# Patient Record
Sex: FEMALE | Race: BLACK OR AFRICAN AMERICAN | NOT HISPANIC OR LATINO | Employment: UNEMPLOYED | URBAN - METROPOLITAN AREA
[De-identification: names, ages, dates, MRNs, and addresses within clinical notes are randomized per-mention and may not be internally consistent; named-entity substitution may affect disease eponyms.]

---

## 2018-11-28 ENCOUNTER — OFFICE VISIT (OUTPATIENT)
Dept: GASTROENTEROLOGY | Facility: CLINIC | Age: 53
End: 2018-11-28
Payer: COMMERCIAL

## 2018-11-28 ENCOUNTER — APPOINTMENT (OUTPATIENT)
Dept: LAB | Facility: CLINIC | Age: 53
End: 2018-11-28
Payer: COMMERCIAL

## 2018-11-28 ENCOUNTER — TELEPHONE (OUTPATIENT)
Dept: GASTROENTEROLOGY | Facility: CLINIC | Age: 53
End: 2018-11-28

## 2018-11-28 VITALS
WEIGHT: 125 LBS | HEIGHT: 62 IN | SYSTOLIC BLOOD PRESSURE: 130 MMHG | HEART RATE: 84 BPM | RESPIRATION RATE: 16 BRPM | DIASTOLIC BLOOD PRESSURE: 90 MMHG | BODY MASS INDEX: 23 KG/M2

## 2018-11-28 DIAGNOSIS — R14.0 BLOATING: ICD-10-CM

## 2018-11-28 DIAGNOSIS — J40 BRONCHITIS: ICD-10-CM

## 2018-11-28 DIAGNOSIS — R10.84 GENERALIZED ABDOMINAL PAIN: ICD-10-CM

## 2018-11-28 DIAGNOSIS — K59.00 CONSTIPATION, UNSPECIFIED CONSTIPATION TYPE: ICD-10-CM

## 2018-11-28 DIAGNOSIS — R10.13 EPIGASTRIC PAIN: ICD-10-CM

## 2018-11-28 DIAGNOSIS — R63.4 WEIGHT LOSS: ICD-10-CM

## 2018-11-28 DIAGNOSIS — R11.2 NAUSEA AND VOMITING, INTRACTABILITY OF VOMITING NOT SPECIFIED, UNSPECIFIED VOMITING TYPE: ICD-10-CM

## 2018-11-28 DIAGNOSIS — R10.13 DYSPEPSIA: ICD-10-CM

## 2018-11-28 DIAGNOSIS — R10.13 EPIGASTRIC PAIN: Primary | ICD-10-CM

## 2018-11-28 LAB
ALBUMIN SERPL BCP-MCNC: 3.8 G/DL (ref 3.5–5)
ALP SERPL-CCNC: 64 U/L (ref 46–116)
ALT SERPL W P-5'-P-CCNC: 20 U/L (ref 12–78)
ANION GAP SERPL CALCULATED.3IONS-SCNC: 6 MMOL/L (ref 4–13)
AST SERPL W P-5'-P-CCNC: 13 U/L (ref 5–45)
BASOPHILS # BLD AUTO: 0.01 THOUSANDS/ΜL (ref 0–0.1)
BASOPHILS NFR BLD AUTO: 0 % (ref 0–1)
BILIRUB SERPL-MCNC: 0.2 MG/DL (ref 0.2–1)
BUN SERPL-MCNC: 16 MG/DL (ref 5–25)
CALCIUM SERPL-MCNC: 9.1 MG/DL (ref 8.3–10.1)
CHLORIDE SERPL-SCNC: 104 MMOL/L (ref 100–108)
CO2 SERPL-SCNC: 28 MMOL/L (ref 21–32)
CREAT SERPL-MCNC: 0.85 MG/DL (ref 0.6–1.3)
CRP SERPL QL: <3 MG/L
EOSINOPHIL # BLD AUTO: 0 THOUSAND/ΜL (ref 0–0.61)
EOSINOPHIL NFR BLD AUTO: 0 % (ref 0–6)
ERYTHROCYTE [DISTWIDTH] IN BLOOD BY AUTOMATED COUNT: 15.6 % (ref 11.6–15.1)
FERRITIN SERPL-MCNC: 38 NG/ML (ref 8–388)
GFR SERPL CREATININE-BSD FRML MDRD: 79 ML/MIN/1.73SQ M
GLUCOSE P FAST SERPL-MCNC: 95 MG/DL (ref 65–99)
HCT VFR BLD AUTO: 37.4 % (ref 34.8–46.1)
HGB BLD-MCNC: 11.7 G/DL (ref 11.5–15.4)
IMM GRANULOCYTES # BLD AUTO: 0.14 THOUSAND/UL (ref 0–0.2)
IMM GRANULOCYTES NFR BLD AUTO: 2 % (ref 0–2)
IRON SATN MFR SERPL: 23 %
IRON SERPL-MCNC: 88 UG/DL (ref 50–170)
LYMPHOCYTES # BLD AUTO: 1.35 THOUSANDS/ΜL (ref 0.6–4.47)
LYMPHOCYTES NFR BLD AUTO: 20 % (ref 14–44)
MCH RBC QN AUTO: 26 PG (ref 26.8–34.3)
MCHC RBC AUTO-ENTMCNC: 31.3 G/DL (ref 31.4–37.4)
MCV RBC AUTO: 83 FL (ref 82–98)
MONOCYTES # BLD AUTO: 0.36 THOUSAND/ΜL (ref 0.17–1.22)
MONOCYTES NFR BLD AUTO: 5 % (ref 4–12)
NEUTROPHILS # BLD AUTO: 4.99 THOUSANDS/ΜL (ref 1.85–7.62)
NEUTS SEG NFR BLD AUTO: 73 % (ref 43–75)
NRBC BLD AUTO-RTO: 0 /100 WBCS
PLATELET # BLD AUTO: 188 THOUSANDS/UL (ref 149–390)
PMV BLD AUTO: 11.6 FL (ref 8.9–12.7)
POTASSIUM SERPL-SCNC: 3.6 MMOL/L (ref 3.5–5.3)
PROT SERPL-MCNC: 8.3 G/DL (ref 6.4–8.2)
RBC # BLD AUTO: 4.5 MILLION/UL (ref 3.81–5.12)
SODIUM SERPL-SCNC: 138 MMOL/L (ref 136–145)
TIBC SERPL-MCNC: 385 UG/DL (ref 250–450)
TSH SERPL DL<=0.05 MIU/L-ACNC: 0.64 UIU/ML (ref 0.36–3.74)
WBC # BLD AUTO: 6.85 THOUSAND/UL (ref 4.31–10.16)

## 2018-11-28 PROCEDURE — 99244 OFF/OP CNSLTJ NEW/EST MOD 40: CPT | Performed by: NURSE PRACTITIONER

## 2018-11-28 PROCEDURE — 36415 COLL VENOUS BLD VENIPUNCTURE: CPT

## 2018-11-28 PROCEDURE — 83540 ASSAY OF IRON: CPT

## 2018-11-28 PROCEDURE — 83550 IRON BINDING TEST: CPT

## 2018-11-28 PROCEDURE — 86140 C-REACTIVE PROTEIN: CPT

## 2018-11-28 PROCEDURE — 82728 ASSAY OF FERRITIN: CPT

## 2018-11-28 PROCEDURE — 80053 COMPREHEN METABOLIC PANEL: CPT

## 2018-11-28 PROCEDURE — 84443 ASSAY THYROID STIM HORMONE: CPT

## 2018-11-28 PROCEDURE — 85025 COMPLETE CBC W/AUTO DIFF WBC: CPT

## 2018-11-28 RX ORDER — PREDNISONE 10 MG/1
TABLET ORAL DAILY
COMMUNITY

## 2018-11-28 RX ORDER — OMEPRAZOLE 20 MG/1
20 CAPSULE, DELAYED RELEASE ORAL 2 TIMES DAILY
Qty: 60 CAPSULE | Refills: 1 | Status: SHIPPED | OUTPATIENT
Start: 2018-11-28 | End: 2019-01-29 | Stop reason: SDUPTHER

## 2018-11-28 RX ORDER — METOCLOPRAMIDE 5 MG/1
5 TABLET ORAL
Qty: 90 TABLET | Refills: 0 | Status: SHIPPED | OUTPATIENT
Start: 2018-11-28 | End: 2019-01-04 | Stop reason: SDUPTHER

## 2018-11-28 RX ORDER — PROMETHAZINE HYDROCHLORIDE AND CODEINE PHOSPHATE 6.25; 1 MG/5ML; MG/5ML
5 SYRUP ORAL EVERY 4 HOURS PRN
COMMUNITY

## 2018-11-28 RX ORDER — BUDESONIDE AND FORMOTEROL FUMARATE DIHYDRATE 160; 4.5 UG/1; UG/1
2 AEROSOL RESPIRATORY (INHALATION) 2 TIMES DAILY
COMMUNITY

## 2018-11-28 NOTE — PROGRESS NOTES
Assessment/Plan:    A 49-year-old female with one month history upper respiratory infection and what sounds like bronchitis currently being treated with antibiotic, prednisone and inhalers   Two week history of nausea, vomiting postprandially, epigastric pain, constipation worsened by patient's use of Pepto-Bismol that she will stop, weight loss, dysphagia, fatigue  Plan:     EGD, colonoscopy     Gastric emptying study     Blood work    Patient had an ultrasound and CT scan at another facility last week and so we will obtain results       Diagnoses and all orders for this visit:    Epigastric pain  -     Iron Panel; Future  -     CBC and differential; Future  -     Comprehensive metabolic panel; Future  -     C-reactive protein; Future  -     TSH, 3rd generation with Free T4 reflex; Future  -     NM gastric emptying; Future  -     metoclopramide (REGLAN) 5 mg tablet; Take 1 tablet (5 mg total) by mouth 3 (three) times a day before meals for 30 days  -     omeprazole (PriLOSEC) 20 mg delayed release capsule; Take 1 capsule (20 mg total) by mouth 2 (two) times a day for 30 days    Generalized abdominal pain  -     Iron Panel; Future  -     CBC and differential; Future  -     Comprehensive metabolic panel; Future  -     C-reactive protein; Future  -     TSH, 3rd generation with Free T4 reflex; Future  -     NM gastric emptying; Future  -     metoclopramide (REGLAN) 5 mg tablet; Take 1 tablet (5 mg total) by mouth 3 (three) times a day before meals for 30 days  -     omeprazole (PriLOSEC) 20 mg delayed release capsule; Take 1 capsule (20 mg total) by mouth 2 (two) times a day for 30 days    Bloating  -     Iron Panel; Future  -     CBC and differential; Future  -     Comprehensive metabolic panel; Future  -     C-reactive protein; Future  -     TSH, 3rd generation with Free T4 reflex; Future  -     NM gastric emptying; Future  -     metoclopramide (REGLAN) 5 mg tablet;  Take 1 tablet (5 mg total) by mouth 3 (three) times a day before meals for 30 days  -     omeprazole (PriLOSEC) 20 mg delayed release capsule; Take 1 capsule (20 mg total) by mouth 2 (two) times a day for 30 days    Dyspepsia  -     Iron Panel; Future  -     CBC and differential; Future  -     Comprehensive metabolic panel; Future  -     C-reactive protein; Future  -     TSH, 3rd generation with Free T4 reflex; Future  -     NM gastric emptying; Future  -     metoclopramide (REGLAN) 5 mg tablet; Take 1 tablet (5 mg total) by mouth 3 (three) times a day before meals for 30 days  -     omeprazole (PriLOSEC) 20 mg delayed release capsule; Take 1 capsule (20 mg total) by mouth 2 (two) times a day for 30 days    Constipation, unspecified constipation type  -     Iron Panel; Future  -     CBC and differential; Future  -     Comprehensive metabolic panel; Future  -     C-reactive protein; Future  -     TSH, 3rd generation with Free T4 reflex; Future  -     NM gastric emptying; Future  -     metoclopramide (REGLAN) 5 mg tablet; Take 1 tablet (5 mg total) by mouth 3 (three) times a day before meals for 30 days  -     omeprazole (PriLOSEC) 20 mg delayed release capsule; Take 1 capsule (20 mg total) by mouth 2 (two) times a day for 30 days    Nausea and vomiting, intractability of vomiting not specified, unspecified vomiting type  -     Iron Panel; Future  -     CBC and differential; Future  -     Comprehensive metabolic panel; Future  -     C-reactive protein; Future  -     TSH, 3rd generation with Free T4 reflex; Future  -     NM gastric emptying; Future  -     metoclopramide (REGLAN) 5 mg tablet; Take 1 tablet (5 mg total) by mouth 3 (three) times a day before meals for 30 days  -     omeprazole (PriLOSEC) 20 mg delayed release capsule; Take 1 capsule (20 mg total) by mouth 2 (two) times a day for 30 days    Bronchitis  -     Iron Panel; Future  -     CBC and differential; Future  -     Comprehensive metabolic panel; Future  -     C-reactive protein;  Future  -     TSH, 3rd generation with Free T4 reflex; Future  -     NM gastric emptying; Future  -     metoclopramide (REGLAN) 5 mg tablet; Take 1 tablet (5 mg total) by mouth 3 (three) times a day before meals for 30 days  -     omeprazole (PriLOSEC) 20 mg delayed release capsule; Take 1 capsule (20 mg total) by mouth 2 (two) times a day for 30 days    Weight loss  -     Iron Panel; Future  -     CBC and differential; Future  -     Comprehensive metabolic panel; Future  -     C-reactive protein; Future  -     TSH, 3rd generation with Free T4 reflex; Future  -     NM gastric emptying; Future  -     metoclopramide (REGLAN) 5 mg tablet; Take 1 tablet (5 mg total) by mouth 3 (three) times a day before meals for 30 days  -     omeprazole (PriLOSEC) 20 mg delayed release capsule; Take 1 capsule (20 mg total) by mouth 2 (two) times a day for 30 days    Other orders  -     ALBUTEROL IN; Inhale  -     budesonide-formoterol (SYMBICORT) 160-4 5 mcg/act inhaler; Inhale 2 puffs 2 (two) times a day Rinse mouth after use  -     predniSONE 10 mg tablet; Take by mouth daily  -     promethazine-codeine (PHENERGAN WITH CODEINE) 6 25-10 mg/5 mL syrup; Take 5 mL by mouth every 4 (four) hours as needed for cough            Subjective:      Patient ID: Carlee Garza is a 46 y o  female  51-year-old female in her normal state of health until a little over a month ago when she developed a horrible, nagging cough and shortness of breath  She seen her PCP who treated her with inhalers and antibiotics and two injections of steroid in the office  She was then referred to Pulmonary and is having a complete pulmonary workup done  She continues with her upper respiratory symptoms and is currently taking prednisone at 5 mg daily, Zithromax and continuing inhaler use  In between two and three weeks ago she started to have significant upper and lower GI symptoms and has lost about 10 lb    She has food fear, nausea, vomiting of bilious and food particles, epigastric pain and bloating, constipation  Generalized abdominal pain  She denies melena or hematochezia she does report a new onset of dysphagia over the past two weeks  The any medication she is taking is Pepto-Bismol which has worsened her constipation  She had a bowel movement yesterday  She has no rectal symptoms  Her last colonoscopy was many years ago  She had blood work yesterday she states but is unable to tell me what it was  She did have a CT scan ultrasound last week at [facility] and we will obtain those records and the blood work if we can  She is very fatigued and is waking up at night for breathing treatments and but sounds like perhaps GERD at night as well  We will give her Prilosec twice a day and I gave her small dose of Reglan to see a for her to take before meals to see if that helps her  She only eats twice a day I did encourage her to eat three small meals a day and to  take the Reglan prior  Abdominal Pain   Associated symptoms include arthralgias, constipation and vomiting  Pertinent negatives include no fever  The following portions of the patient's history were reviewed and updated as appropriate: She  has a past medical history of Anemia  She   Patient Active Problem List    Diagnosis Date Noted    Weight loss 11/28/2018    Epigastric pain 11/28/2018    Generalized abdominal pain 11/28/2018    Bloating 11/28/2018    Dyspepsia 11/28/2018    Constipation 11/28/2018    Nausea and vomiting 11/28/2018    Bronchitis 11/28/2018     She  has a past surgical history that includes Back surgery (2018) and Mandible surgery  Her family history includes No Known Problems in her mother  She  reports that she has never smoked  She has never used smokeless tobacco  She reports that she does not drink alcohol or use drugs    Current Outpatient Prescriptions   Medication Sig Dispense Refill    ALBUTEROL IN Inhale      budesonide-formoterol (SYMBICORT) 160-4 5 mcg/act inhaler Inhale 2 puffs 2 (two) times a day Rinse mouth after use   predniSONE 10 mg tablet Take by mouth daily      promethazine-codeine (PHENERGAN WITH CODEINE) 6 25-10 mg/5 mL syrup Take 5 mL by mouth every 4 (four) hours as needed for cough      metoclopramide (REGLAN) 5 mg tablet Take 1 tablet (5 mg total) by mouth 3 (three) times a day before meals for 30 days 90 tablet 0    omeprazole (PriLOSEC) 20 mg delayed release capsule Take 1 capsule (20 mg total) by mouth 2 (two) times a day for 30 days 60 capsule 1     No current facility-administered medications for this visit  No current outpatient prescriptions on file prior to visit  No current facility-administered medications on file prior to visit  She is allergic to amoxicillin       Review of Systems   Constitutional: Positive for activity change, appetite change, fatigue and unexpected weight change  Negative for chills and fever  HENT: Negative  Respiratory: Negative  Cardiovascular: Negative  Gastrointestinal: Positive for abdominal pain, constipation and vomiting  Musculoskeletal: Positive for arthralgias  Skin: Negative  Psychiatric/Behavioral: Negative  Objective:      /90   Pulse 84   Resp 16   Ht 5' 2" (1 575 m)   Wt 56 7 kg (125 lb)   BMI 22 86 kg/m²          Physical Exam   Constitutional: She appears well-developed and well-nourished  Cardiovascular: Normal rate and regular rhythm  Pulmonary/Chest: Effort normal and breath sounds normal    Abdominal: Soft  Bowel sounds are normal  There is tenderness

## 2018-11-28 NOTE — LETTER
November 29, 2018     Leatha Wells MD  [address] [facility] [address]    Patient: Rach Cuevas   YOB: 1965   Date of Visit: 11/28/2018       Dear Dr Lissette Davidson:    Thank you for referring Rach Cuevas to me for evaluation  Below are my notes for this consultation  If you have questions, please do not hesitate to call me  I look forward to following your patient along with you  Sincerely,        JEANNIE Blankenship        CC: No Recipients  JEANNIE Blankenship  11/28/2018 10:10 AM  Attested  Assessment/Plan:    A 29-year-old female with one month history upper respiratory infection and what sounds like bronchitis currently being treated with antibiotic, prednisone and inhalers   Two week history of nausea, vomiting postprandially, epigastric pain, constipation worsened by patient's use of Pepto-Bismol that she will stop, weight loss, dysphagia, fatigue  Plan:     EGD, colonoscopy     Gastric emptying study     Blood work    Patient had an ultrasound and CT scan at another facility last week and so we will obtain results       Diagnoses and all orders for this visit:    Epigastric pain  -     Iron Panel; Future  -     CBC and differential; Future  -     Comprehensive metabolic panel; Future  -     C-reactive protein; Future  -     TSH, 3rd generation with Free T4 reflex; Future  -     NM gastric emptying; Future  -     metoclopramide (REGLAN) 5 mg tablet; Take 1 tablet (5 mg total) by mouth 3 (three) times a day before meals for 30 days  -     omeprazole (PriLOSEC) 20 mg delayed release capsule; Take 1 capsule (20 mg total) by mouth 2 (two) times a day for 30 days    Generalized abdominal pain  -     Iron Panel; Future  -     CBC and differential; Future  -     Comprehensive metabolic panel; Future  -     C-reactive protein; Future  -     TSH, 3rd generation with Free T4 reflex; Future  -     NM gastric emptying; Future  -     metoclopramide (REGLAN) 5 mg tablet;  Take 1 tablet (5 mg total) by mouth 3 (three) times a day before meals for 30 days  -     omeprazole (PriLOSEC) 20 mg delayed release capsule; Take 1 capsule (20 mg total) by mouth 2 (two) times a day for 30 days    Bloating  -     Iron Panel; Future  -     CBC and differential; Future  -     Comprehensive metabolic panel; Future  -     C-reactive protein; Future  -     TSH, 3rd generation with Free T4 reflex; Future  -     NM gastric emptying; Future  -     metoclopramide (REGLAN) 5 mg tablet; Take 1 tablet (5 mg total) by mouth 3 (three) times a day before meals for 30 days  -     omeprazole (PriLOSEC) 20 mg delayed release capsule; Take 1 capsule (20 mg total) by mouth 2 (two) times a day for 30 days    Dyspepsia  -     Iron Panel; Future  -     CBC and differential; Future  -     Comprehensive metabolic panel; Future  -     C-reactive protein; Future  -     TSH, 3rd generation with Free T4 reflex; Future  -     NM gastric emptying; Future  -     metoclopramide (REGLAN) 5 mg tablet; Take 1 tablet (5 mg total) by mouth 3 (three) times a day before meals for 30 days  -     omeprazole (PriLOSEC) 20 mg delayed release capsule; Take 1 capsule (20 mg total) by mouth 2 (two) times a day for 30 days    Constipation, unspecified constipation type  -     Iron Panel; Future  -     CBC and differential; Future  -     Comprehensive metabolic panel; Future  -     C-reactive protein; Future  -     TSH, 3rd generation with Free T4 reflex; Future  -     NM gastric emptying; Future  -     metoclopramide (REGLAN) 5 mg tablet; Take 1 tablet (5 mg total) by mouth 3 (three) times a day before meals for 30 days  -     omeprazole (PriLOSEC) 20 mg delayed release capsule; Take 1 capsule (20 mg total) by mouth 2 (two) times a day for 30 days    Nausea and vomiting, intractability of vomiting not specified, unspecified vomiting type  -     Iron Panel; Future  -     CBC and differential; Future  -     Comprehensive metabolic panel;  Future  - C-reactive protein; Future  -     TSH, 3rd generation with Free T4 reflex; Future  -     NM gastric emptying; Future  -     metoclopramide (REGLAN) 5 mg tablet; Take 1 tablet (5 mg total) by mouth 3 (three) times a day before meals for 30 days  -     omeprazole (PriLOSEC) 20 mg delayed release capsule; Take 1 capsule (20 mg total) by mouth 2 (two) times a day for 30 days    Bronchitis  -     Iron Panel; Future  -     CBC and differential; Future  -     Comprehensive metabolic panel; Future  -     C-reactive protein; Future  -     TSH, 3rd generation with Free T4 reflex; Future  -     NM gastric emptying; Future  -     metoclopramide (REGLAN) 5 mg tablet; Take 1 tablet (5 mg total) by mouth 3 (three) times a day before meals for 30 days  -     omeprazole (PriLOSEC) 20 mg delayed release capsule; Take 1 capsule (20 mg total) by mouth 2 (two) times a day for 30 days    Weight loss  -     Iron Panel; Future  -     CBC and differential; Future  -     Comprehensive metabolic panel; Future  -     C-reactive protein; Future  -     TSH, 3rd generation with Free T4 reflex; Future  -     NM gastric emptying; Future  -     metoclopramide (REGLAN) 5 mg tablet; Take 1 tablet (5 mg total) by mouth 3 (three) times a day before meals for 30 days  -     omeprazole (PriLOSEC) 20 mg delayed release capsule; Take 1 capsule (20 mg total) by mouth 2 (two) times a day for 30 days    Other orders  -     ALBUTEROL IN; Inhale  -     budesonide-formoterol (SYMBICORT) 160-4 5 mcg/act inhaler; Inhale 2 puffs 2 (two) times a day Rinse mouth after use  -     predniSONE 10 mg tablet; Take by mouth daily  -     promethazine-codeine (PHENERGAN WITH CODEINE) 6 25-10 mg/5 mL syrup; Take 5 mL by mouth every 4 (four) hours as needed for cough            Subjective:      Patient ID: Jennifer Dill is a 46 y o  female      26-year-old female in her normal state of health until a little over a month ago when she developed a horrible, nagging cough and shortness of breath  She seen her PCP who treated her with inhalers and antibiotics and two injections of steroid in the office  She was then referred to Pulmonary and is having a complete pulmonary workup done  She continues with her upper respiratory symptoms and is currently taking prednisone at 5 mg daily, Zithromax and continuing inhaler use  In between two and three weeks ago she started to have significant upper and lower GI symptoms and has lost about 10 lb  She has food fear, nausea, vomiting of bilious and food particles, epigastric pain and bloating, constipation  Generalized abdominal pain  She denies melena or hematochezia she does report a new onset of dysphagia over the past two weeks  The any medication she is taking is Pepto-Bismol which has worsened her constipation  She had a bowel movement yesterday  She has no rectal symptoms  Her last colonoscopy was many years ago  She had blood work yesterday she states but is unable to tell me what it was  She did have a CT scan ultrasound last week at [facility] and we will obtain those records and the blood work if we can  She is very fatigued and is waking up at night for breathing treatments and but sounds like perhaps GERD at night as well  We will give her Prilosec twice a day and I gave her small dose of Reglan to see a for her to take before meals to see if that helps her  She only eats twice a day I did encourage her to eat three small meals a day and to  take the Reglan prior  Abdominal Pain   Associated symptoms include arthralgias, constipation and vomiting  Pertinent negatives include no fever  The following portions of the patient's history were reviewed and updated as appropriate: She  has a past medical history of Anemia    She   Patient Active Problem List    Diagnosis Date Noted    Weight loss 11/28/2018    Epigastric pain 11/28/2018    Generalized abdominal pain 11/28/2018    Bloating 11/28/2018    Dyspepsia 11/28/2018    Constipation 11/28/2018    Nausea and vomiting 11/28/2018    Bronchitis 11/28/2018     She  has a past surgical history that includes Back surgery (2018) and Mandible surgery  Her family history includes No Known Problems in her mother  She  reports that she has never smoked  She has never used smokeless tobacco  She reports that she does not drink alcohol or use drugs  Current Outpatient Prescriptions   Medication Sig Dispense Refill    ALBUTEROL IN Inhale      budesonide-formoterol (SYMBICORT) 160-4 5 mcg/act inhaler Inhale 2 puffs 2 (two) times a day Rinse mouth after use   predniSONE 10 mg tablet Take by mouth daily      promethazine-codeine (PHENERGAN WITH CODEINE) 6 25-10 mg/5 mL syrup Take 5 mL by mouth every 4 (four) hours as needed for cough      metoclopramide (REGLAN) 5 mg tablet Take 1 tablet (5 mg total) by mouth 3 (three) times a day before meals for 30 days 90 tablet 0    omeprazole (PriLOSEC) 20 mg delayed release capsule Take 1 capsule (20 mg total) by mouth 2 (two) times a day for 30 days 60 capsule 1     No current facility-administered medications for this visit  No current outpatient prescriptions on file prior to visit  No current facility-administered medications on file prior to visit  She is allergic to amoxicillin       Review of Systems   Constitutional: Positive for activity change, appetite change, fatigue and unexpected weight change  Negative for chills and fever  HENT: Negative  Respiratory: Negative  Cardiovascular: Negative  Gastrointestinal: Positive for abdominal pain, constipation and vomiting  Musculoskeletal: Positive for arthralgias  Skin: Negative  Psychiatric/Behavioral: Negative  Objective:      /90   Pulse 84   Resp 16   Ht 5' 2" (1 575 m)   Wt 56 7 kg (125 lb)   BMI 22 86 kg/m²           Physical Exam   Constitutional: She appears well-developed and well-nourished     Cardiovascular: Normal rate and regular rhythm  Pulmonary/Chest: Effort normal and breath sounds normal    Abdominal: Soft  Bowel sounds are normal  There is tenderness  Attestation signed by Kevin Meraz MD at 11/28/2018 10:33 AM:  I supervised the Advanced Practitioner  I reviewed the Advanced Practitioner note and agree      Kevin Meraz MD 11/28/18

## 2018-11-28 NOTE — TELEPHONE ENCOUNTER
----- Message from Juliana Canela, [address] sent at 11/28/2018  2:58 PM EST -----  Please let Aretha James know her blood count is just slightly low normal but everything including her thyroid panel is perfect!

## 2018-12-03 ENCOUNTER — TELEPHONE (OUTPATIENT)
Dept: GASTROENTEROLOGY | Facility: CLINIC | Age: 53
End: 2018-12-03

## 2018-12-03 NOTE — TELEPHONE ENCOUNTER
PATIENT DOES NOT NEED TO R/S SHE TOOK AN ALEVE LAST NIGHT AND I ADVISED HER NOT TO TAKE ANYMORE SO SHE WILL BE KEEPING HER PROCEDURE DATE OF 12/6/18

## 2018-12-05 PROBLEM — R10.13 ABDOMINAL PAIN, EPIGASTRIC: Status: ACTIVE | Noted: 2018-12-05

## 2018-12-05 PROBLEM — R63.4 LOSS OF WEIGHT: Status: ACTIVE | Noted: 2018-12-05

## 2018-12-05 PROBLEM — R10.84 ABDOMINAL PAIN, GENERALIZED: Status: ACTIVE | Noted: 2018-12-05

## 2018-12-12 ENCOUNTER — ANESTHESIA EVENT (OUTPATIENT)
Dept: PERIOP | Facility: HOSPITAL | Age: 53
End: 2018-12-12
Payer: COMMERCIAL

## 2018-12-12 ENCOUNTER — ANESTHESIA (OUTPATIENT)
Dept: PERIOP | Facility: HOSPITAL | Age: 53
End: 2018-12-12
Payer: COMMERCIAL

## 2018-12-12 ENCOUNTER — HOSPITAL ENCOUNTER (OUTPATIENT)
Facility: HOSPITAL | Age: 53
Setting detail: OUTPATIENT SURGERY
Discharge: HOME | End: 2018-12-12
Attending: INTERNAL MEDICINE | Admitting: INTERNAL MEDICINE
Payer: COMMERCIAL

## 2018-12-12 VITALS
HEIGHT: 63 IN | SYSTOLIC BLOOD PRESSURE: 121 MMHG | WEIGHT: 117.73 LBS | HEART RATE: 84 BPM | BODY MASS INDEX: 20.86 KG/M2 | OXYGEN SATURATION: 100 % | DIASTOLIC BLOOD PRESSURE: 73 MMHG | TEMPERATURE: 97.5 F | RESPIRATION RATE: 14 BRPM

## 2018-12-12 DIAGNOSIS — K59.00 CONSTIPATION, UNSPECIFIED CONSTIPATION TYPE: ICD-10-CM

## 2018-12-12 DIAGNOSIS — R10.84 ABDOMINAL PAIN, GENERALIZED: ICD-10-CM

## 2018-12-12 DIAGNOSIS — R10.13 ABDOMINAL PAIN, EPIGASTRIC: ICD-10-CM

## 2018-12-12 DIAGNOSIS — R11.2 NAUSEA AND VOMITING, INTRACTABILITY OF VOMITING NOT SPECIFIED, UNSPECIFIED VOMITING TYPE: ICD-10-CM

## 2018-12-12 DIAGNOSIS — R63.4 LOSS OF WEIGHT: ICD-10-CM

## 2018-12-12 PROCEDURE — 88342 IMHCHEM/IMCYTCHM 1ST ANTB: CPT | Performed by: PATHOLOGY

## 2018-12-12 PROCEDURE — 88305 TISSUE EXAM BY PATHOLOGIST: CPT | Performed by: PATHOLOGY

## 2018-12-12 PROCEDURE — 45378 DIAGNOSTIC COLONOSCOPY: CPT | Performed by: INTERNAL MEDICINE

## 2018-12-12 PROCEDURE — 43239 EGD BIOPSY SINGLE/MULTIPLE: CPT | Performed by: INTERNAL MEDICINE

## 2018-12-12 RX ORDER — SODIUM CHLORIDE, SODIUM LACTATE, POTASSIUM CHLORIDE, CALCIUM CHLORIDE 600; 310; 30; 20 MG/100ML; MG/100ML; MG/100ML; MG/100ML
75 INJECTION, SOLUTION INTRAVENOUS CONTINUOUS
Status: DISCONTINUED | OUTPATIENT
Start: 2018-12-12 | End: 2018-12-12 | Stop reason: HOSPADM

## 2018-12-12 RX ORDER — GLYCOPYRROLATE 0.2 MG/ML
INJECTION INTRAMUSCULAR; INTRAVENOUS AS NEEDED
Status: DISCONTINUED | OUTPATIENT
Start: 2018-12-12 | End: 2018-12-12 | Stop reason: SURG

## 2018-12-12 RX ORDER — PROPOFOL 10 MG/ML
INJECTION, EMULSION INTRAVENOUS AS NEEDED
Status: DISCONTINUED | OUTPATIENT
Start: 2018-12-12 | End: 2018-12-12 | Stop reason: SURG

## 2018-12-12 RX ADMIN — SODIUM CHLORIDE, SODIUM LACTATE, POTASSIUM CHLORIDE, AND CALCIUM CHLORIDE: .6; .31; .03; .02 INJECTION, SOLUTION INTRAVENOUS at 09:32

## 2018-12-12 RX ADMIN — PROPOFOL 100 MG: 10 INJECTION, EMULSION INTRAVENOUS at 10:11

## 2018-12-12 RX ADMIN — PROPOFOL 30 MG: 10 INJECTION, EMULSION INTRAVENOUS at 10:16

## 2018-12-12 RX ADMIN — PROPOFOL 30 MG: 10 INJECTION, EMULSION INTRAVENOUS at 10:22

## 2018-12-12 RX ADMIN — GLYCOPYRROLATE 0.2 MG: 0.2 INJECTION, SOLUTION INTRAMUSCULAR; INTRAVENOUS at 10:09

## 2018-12-12 NOTE — DISCHARGE INSTRUCTIONS

## 2018-12-12 NOTE — DISCHARGE INSTR - AVS FIRST PAGE
Postoperative Note  PATIENT NAME: Martínez Alonso  : 1965  MRN: [id number]  [facility]    Surgery Date: 2018    POST-OP DIAGNOSIS: See the impression below    SEDATION: Monitored anesthesia care, check anesthesia records    PHYSICAL EXAM:  Vitals:    18 0907   BP: 125/80   Pulse: 85   Resp: 16   Temp: 97 9 °F (36 6 °C)   SpO2: 100%     Body mass index is 20 85 kg/m²  General: NAD  Heart: S1 & S2 normal, RRR  Lungs: CTA, No rales or rhonchi  Abdomen: Soft, nontender, nondistended, good bowel sounds    CONSENT:  Informed consent was obtained for the procedure, including sedation after explaining the risks and benefits of the procedure  Risks including but not limited to bleeding, perforation, infection, aspiration were discussed in detail  Also explained about less than 100%$ sensitivity with the exam and other alternatives  DESCRIPTION:   Procedure:  Esophagogastroduodenoscopy with biopsy    Indications:  55-year-old female with nausea, vomiting, midepigastric pain and dysphagia    ASA 1    Estimated blood loss: Insignificant    Premedicated with mac anesthesia    Patient is identified by me  She is examined prior to the procedure and found to be in stable condition  She is attached to cardiac monitor and pulse oximeter and placed in the left lateral position  After adequate intravenous sedation the Olympus video gastroscope was inserted under direct vision into the esophagus  The esophageal mucosa is completely normal throughout its length with a normal Z-line at 40 cm from incisors  The stomach is entered entered  Body and antrum normal   The pylorus is normal   The duodenum was cannulated into the 3rd portion and is normal   Retroversion reveals a normal GE junction and fundus  Cardia is normal   Biopsies taken from the antrum body fundus with excellent hemostasis  Biopsies taken from the distal and proximal esophagus with excellent hemostasis    She tolerated the procedure well and was stable throughout  My impression:  Normal upper GI endoscopy, status post biopsy    RECOMMENDATIONS:  Follow up biopsy results in 1 week  Continue current medical management    COMPLICATIONS:  None; patient tolerated the procedure well  DISPOSITION: PACU  CONDITION: Stable    Vanessa Benitez MD  2018,10:16 AM        Portions of the record may have been created with voice recognition software   Occasional wrong word or "sound a like" substitutions may have occurred due to the inherent limitations of voice recognition software   Read the chart carefully and recognize, using context, where substitutions have occurred  Postoperative Note  PATIENT NAME: Juanita Cueva  : 1965  MRN: [id number]  [facility]    Surgery Date: 2018    POST-OP DIAGNOSIS: See the impression below    SEDATION: Monitored anesthesia care, check anesthesia records    PHYSICAL EXAM:  Vitals:    18 0907   BP: 125/80   Pulse: 85   Resp: 16   Temp: 97 9 °F (36 6 °C)   SpO2: 100%     Body mass index is 20 85 kg/m²  General: NAD  Heart: S1 & S2 normal, RRR  Lungs: CTA, No rales or rhonchi  Abdomen: Soft, nontender, nondistended, good bowel sounds    CONSENT:  Informed consent was obtained for the procedure, including sedation after explaining the risks and benefits of the procedure  Risks including but not limited to bleeding, perforation, infection, aspiration were discussed in detail  Also explained about less than 100%$ sensitivity with the exam and other alternatives  DESCRIPTION:   Procedure:  Fiberoptic colonoscopy    Indications:  46year old female with constipation and weight loss of undetermined etiology    ASA 1    Estimated blood loss:  None    Premedicated with mac anesthesia    Patient is identified by me  She is examined prior to the procedure and found to be in stable condition  She is attached to the cardiac monitor and pulse oximeter and placed in the left lateral position  After adequate intravenous sedation the Olympus video colonoscope was inserted passed easily to the cecum  The ileocecal valve was identified as well as the appendiceal orifice  The valve was cannulated and the terminal ileum is examined for approximately 10 cm  The scope was slowly withdrawn with excellent circumferential visualization of the mucosa  The preparation is excellent  The terminal ileum and the colon a completely unremarkable with no inflammatory neoplastic or vascular lesions noted  Retroversion is normal   She tolerated the procedure well and was stable throughout  My impression:  Normal colonoscopy and terminal ileoscopy    RECOMMENDATIONS:  Follow-up colonoscopy in 10 years  Yearly fit test with primary care physician  Every 3 year colo guard test with primary care physician    COMPLICATIONS:  None; patient tolerated the procedure well  DISPOSITION: PACU  CONDITION: Stable    Farzad Whitehead MD  12/12/2018,10:27 AM        Portions of the record may have been created with voice recognition software   Occasional wrong word or "sound a like" substitutions may have occurred due to the inherent limitations of voice recognition software   Read the chart carefully and recognize, using context, where substitutions have occurred

## 2018-12-12 NOTE — OP NOTE
Postoperative Note  PATIENT NAME: Jennifer Dill  : 1965  MRN: [id number]  [facility]    Surgery Date: 2018    POST-OP DIAGNOSIS: See the impression below    SEDATION: Monitored anesthesia care, check anesthesia records    PHYSICAL EXAM:  Vitals:    18 0907   BP: 125/80   Pulse: 85   Resp: 16   Temp: 97 9 °F (36 6 °C)   SpO2: 100%     Body mass index is 20 85 kg/m²  General: NAD  Heart: S1 & S2 normal, RRR  Lungs: CTA, No rales or rhonchi  Abdomen: Soft, nontender, nondistended, good bowel sounds    CONSENT:  Informed consent was obtained for the procedure, including sedation after explaining the risks and benefits of the procedure  Risks including but not limited to bleeding, perforation, infection, aspiration were discussed in detail  Also explained about less than 100%$ sensitivity with the exam and other alternatives  DESCRIPTION:   Procedure:  Esophagogastroduodenoscopy with biopsy    Indications:  30-year-old female with nausea, vomiting, midepigastric pain and dysphagia    ASA 1    Estimated blood loss: Insignificant    Premedicated with mac anesthesia    Patient is identified by me  She is examined prior to the procedure and found to be in stable condition  She is attached to cardiac monitor and pulse oximeter and placed in the left lateral position  After adequate intravenous sedation the Olympus video gastroscope was inserted under direct vision into the esophagus  The esophageal mucosa is completely normal throughout its length with a normal Z-line at 40 cm from incisors  The stomach is entered entered  Body and antrum normal   The pylorus is normal   The duodenum was cannulated into the 3rd portion and is normal   Retroversion reveals a normal GE junction and fundus  Cardia is normal   Biopsies taken from the antrum body fundus with excellent hemostasis  Biopsies taken from the distal and proximal esophagus with excellent hemostasis    She tolerated the procedure well and was stable throughout  My impression:  Normal upper GI endoscopy, status post biopsy    RECOMMENDATIONS:  Follow up biopsy results in 1 week  Continue current medical management    COMPLICATIONS:  None; patient tolerated the procedure well  DISPOSITION: PACU  CONDITION: Stable    Lindsay Zacarias MD  12/12/2018,10:16 AM        Portions of the record may have been created with voice recognition software   Occasional wrong word or "sound a like" substitutions may have occurred due to the inherent limitations of voice recognition software   Read the chart carefully and recognize, using context, where substitutions have occurred

## 2018-12-12 NOTE — OP NOTE
Postoperative Note  PATIENT NAME: Nathan Wu  : 1965  MRN: [id number]  [facility]    Surgery Date: 2018    POST-OP DIAGNOSIS: See the impression below    SEDATION: Monitored anesthesia care, check anesthesia records    PHYSICAL EXAM:  Vitals:    18 0907   BP: 125/80   Pulse: 85   Resp: 16   Temp: 97 9 °F (36 6 °C)   SpO2: 100%     Body mass index is 20 85 kg/m²  General: NAD  Heart: S1 & S2 normal, RRR  Lungs: CTA, No rales or rhonchi  Abdomen: Soft, nontender, nondistended, good bowel sounds    CONSENT:  Informed consent was obtained for the procedure, including sedation after explaining the risks and benefits of the procedure  Risks including but not limited to bleeding, perforation, infection, aspiration were discussed in detail  Also explained about less than 100%$ sensitivity with the exam and other alternatives  DESCRIPTION:   Procedure:  Fiberoptic colonoscopy    Indications:  46year old female with constipation and weight loss of undetermined etiology    ASA 1    Estimated blood loss:  None    Premedicated with mac anesthesia    Patient is identified by me  She is examined prior to the procedure and found to be in stable condition  She is attached to the cardiac monitor and pulse oximeter and placed in the left lateral position  After adequate intravenous sedation the Olympus video colonoscope was inserted passed easily to the cecum  The ileocecal valve was identified as well as the appendiceal orifice  The valve was cannulated and the terminal ileum is examined for approximately 10 cm  The scope was slowly withdrawn with excellent circumferential visualization of the mucosa  The preparation is excellent  The terminal ileum and the colon a completely unremarkable with no inflammatory neoplastic or vascular lesions noted  Retroversion is normal   She tolerated the procedure well and was stable throughout      My impression:  Normal colonoscopy and terminal ileoscopy    RECOMMENDATIONS:  Follow-up colonoscopy in 10 years  Yearly fit test with primary care physician  Every 3 year colo guard test with primary care physician    COMPLICATIONS:  None; patient tolerated the procedure well  DISPOSITION: PACU  CONDITION: Stable    Ken Johnson MD  12/12/2018,10:27 AM        Portions of the record may have been created with voice recognition software   Occasional wrong word or "sound a like" substitutions may have occurred due to the inherent limitations of voice recognition software   Read the chart carefully and recognize, using context, where substitutions have occurred

## 2018-12-12 NOTE — ANESTHESIA POSTPROCEDURE EVALUATION
Post-Op Assessment Note      CV Status:  Stable    Mental Status:  Alert and awake    Hydration Status:  Stable    Airway Patency:  Patent and adequate    Post Op Vitals Reviewed: Yes          Staff: Anesthesiologist, CRNA           BP   102/52   Temp      Pulse  98   Resp   18   SpO2   100%

## 2018-12-12 NOTE — ANESTHESIA PREPROCEDURE EVALUATION
Review of Systems/Medical History  Patient summary reviewed  Chart reviewed      Cardiovascular   Pulmonary       GI/Hepatic            Endo/Other     GYN       Hematology   Musculoskeletal       Neurology   Psychology           Physical Exam    Airway      TM Distance: >3 FB  Neck ROM: full     Dental   No notable dental hx     Cardiovascular  Rhythm: regular, Rate: normal, Cardiovascular exam normal    Pulmonary  Pulmonary exam normal Breath sounds clear to auscultation,     Other Findings        Anesthesia Plan  ASA Score- 1     Anesthesia Type- IV sedation with anesthesia with ASA Monitors  Additional Monitors:   Airway Plan:         Plan Factors-    Induction- intravenous  Postoperative Plan- Plan for postoperative opioid use  Informed Consent- Anesthetic plan and risks discussed with patient and spouse  I personally reviewed this patient with the CRNA  Discussed and agreed on the Anesthesia Plan with the CRNA  Pio Sam

## 2019-01-04 DIAGNOSIS — R10.13 EPIGASTRIC PAIN: ICD-10-CM

## 2019-01-04 DIAGNOSIS — R63.4 WEIGHT LOSS: ICD-10-CM

## 2019-01-04 DIAGNOSIS — R10.84 GENERALIZED ABDOMINAL PAIN: ICD-10-CM

## 2019-01-04 DIAGNOSIS — R14.0 BLOATING: ICD-10-CM

## 2019-01-04 DIAGNOSIS — R11.2 NAUSEA AND VOMITING, INTRACTABILITY OF VOMITING NOT SPECIFIED, UNSPECIFIED VOMITING TYPE: ICD-10-CM

## 2019-01-04 DIAGNOSIS — R10.13 DYSPEPSIA: ICD-10-CM

## 2019-01-04 DIAGNOSIS — K59.00 CONSTIPATION, UNSPECIFIED CONSTIPATION TYPE: ICD-10-CM

## 2019-01-04 DIAGNOSIS — J40 BRONCHITIS: ICD-10-CM

## 2019-01-08 RX ORDER — METOCLOPRAMIDE 5 MG/1
5 TABLET ORAL
Qty: 90 TABLET | Refills: 0 | Status: SHIPPED | OUTPATIENT
Start: 2019-01-08 | End: 2019-02-07

## 2019-01-29 DIAGNOSIS — K59.00 CONSTIPATION, UNSPECIFIED CONSTIPATION TYPE: ICD-10-CM

## 2019-01-29 DIAGNOSIS — R63.4 WEIGHT LOSS: ICD-10-CM

## 2019-01-29 DIAGNOSIS — J40 BRONCHITIS: ICD-10-CM

## 2019-01-29 DIAGNOSIS — R10.13 DYSPEPSIA: ICD-10-CM

## 2019-01-29 DIAGNOSIS — R14.0 BLOATING: ICD-10-CM

## 2019-01-29 DIAGNOSIS — R11.2 NAUSEA AND VOMITING, INTRACTABILITY OF VOMITING NOT SPECIFIED, UNSPECIFIED VOMITING TYPE: ICD-10-CM

## 2019-01-29 DIAGNOSIS — R10.84 GENERALIZED ABDOMINAL PAIN: ICD-10-CM

## 2019-01-29 DIAGNOSIS — R10.13 EPIGASTRIC PAIN: ICD-10-CM

## 2019-01-29 RX ORDER — OMEPRAZOLE 20 MG/1
20 CAPSULE, DELAYED RELEASE ORAL 2 TIMES DAILY
Qty: 60 CAPSULE | Refills: 1 | Status: SHIPPED | OUTPATIENT
Start: 2019-01-29 | End: 2019-02-28

## 2021-04-04 ENCOUNTER — HOSPITAL ENCOUNTER (EMERGENCY)
Facility: HOSPITAL | Age: 56
Discharge: HOME | End: 2021-04-04
Attending: EMERGENCY MEDICINE
Payer: COMMERCIAL

## 2021-04-04 VITALS
SYSTOLIC BLOOD PRESSURE: 138 MMHG | DIASTOLIC BLOOD PRESSURE: 76 MMHG | RESPIRATION RATE: 18 BRPM | TEMPERATURE: 97.2 F | BODY MASS INDEX: 21.26 KG/M2 | OXYGEN SATURATION: 100 % | HEIGHT: 63 IN | HEART RATE: 78 BPM | WEIGHT: 120 LBS

## 2021-04-04 DIAGNOSIS — R94.31 ABNORMAL EKG: Primary | ICD-10-CM

## 2021-04-04 LAB
ATRIAL RATE: 80 BPM
P AXIS: 77 DEGREES
PR INTERVAL: 182 MS
QRS AXIS: 65 DEGREES
QRSD INTERVAL: 76 MS
QT INTERVAL: 364 MS
QTC INTERVAL: 419 MS
T WAVE AXIS: 57 DEGREES
VENTRICULAR RATE: 80 BPM

## 2021-04-04 PROCEDURE — 93005 ELECTROCARDIOGRAM TRACING: CPT

## 2021-04-04 PROCEDURE — 93010 ELECTROCARDIOGRAM REPORT: CPT | Performed by: INTERNAL MEDICINE

## 2021-04-04 PROCEDURE — 99284 EMERGENCY DEPT VISIT MOD MDM: CPT | Performed by: EMERGENCY MEDICINE

## 2021-04-04 PROCEDURE — 99284 EMERGENCY DEPT VISIT MOD MDM: CPT
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
## 2021-08-17 ENCOUNTER — TELEPHONE (OUTPATIENT)
Dept: FAMILY MEDICINE CLINIC | Facility: CLINIC | Age: 56
End: 2021-08-17

## 2021-08-17 NOTE — TELEPHONE ENCOUNTER
Copy of message sent to patient via [employer] (acquired by [employer] in 2014)  Tiara would be a New Patient to us but looks like she is scheduling a Pre Op appt first    Her insurance is also not assigned to us  Hi Tiara,  I just left you a voice mail message asking you to call our office back  I need to get some more information from you and discuss your insurance  You are currently assigned to another physician so we would be unable to see you until that is updated  It also appears that you may be scheduling a Pre Operative clearance since you are requesting EKG Test and Clearance  If so, since you would be a new patient to us, we would need to have you schedule a New Patient appointment first before we could clear you for a procedure  After your New Patient appointment we would then schedule you for a Preoperative Clearance  I know that sounds like a lot  Please give us a call at [phone] if you have any questions and we will be happy to assist you  Thank you,   [facility]  [phone]    ===View-only below this line===      ----- Message -----       From:Tiara Tracey Sunflower       Sent:2021  4:19 AM EDT         To:[address] [facility] [address] [facility]  scheduled from [address] [place] [address]    Appointment For: Manuela Holloway ([id number])  Visit Type: [facility] OPEN SCHEDULING PG ([id number])    2021    8:15 AM  30 mins  MD JESÚS Albert [address]    Patient Comments:  EKG   Test and  Clearance

## 2022-04-20 ENCOUNTER — TELEPHONE (OUTPATIENT)
Dept: FAMILY MEDICINE CLINIC | Facility: CLINIC | Age: 57
End: 2022-04-20

## 2022-04-20 NOTE — TELEPHONE ENCOUNTER
DANIEL from [address] [place] [address] sent to [facility] SURGICAL SPECIALTY [facility] on 3/31/2022